# Patient Record
Sex: MALE | Race: ASIAN | NOT HISPANIC OR LATINO
[De-identification: names, ages, dates, MRNs, and addresses within clinical notes are randomized per-mention and may not be internally consistent; named-entity substitution may affect disease eponyms.]

---

## 2017-01-19 ENCOUNTER — FORM ENCOUNTER (OUTPATIENT)
Age: 67
End: 2017-01-19

## 2017-01-20 ENCOUNTER — OUTPATIENT (OUTPATIENT)
Dept: OUTPATIENT SERVICES | Facility: HOSPITAL | Age: 67
LOS: 1 days | End: 2017-01-20
Payer: MEDICARE

## 2017-01-20 ENCOUNTER — APPOINTMENT (OUTPATIENT)
Dept: CT IMAGING | Facility: CLINIC | Age: 67
End: 2017-01-20

## 2017-01-20 DIAGNOSIS — R05 COUGH: ICD-10-CM

## 2017-01-20 DIAGNOSIS — Z00.8 ENCOUNTER FOR OTHER GENERAL EXAMINATION: ICD-10-CM

## 2017-01-20 PROCEDURE — 71250 CT THORAX DX C-: CPT

## 2017-03-14 ENCOUNTER — APPOINTMENT (OUTPATIENT)
Dept: PULMONOLOGY | Facility: CLINIC | Age: 67
End: 2017-03-14

## 2017-04-04 ENCOUNTER — APPOINTMENT (OUTPATIENT)
Dept: PULMONOLOGY | Facility: CLINIC | Age: 67
End: 2017-04-04

## 2017-04-04 VITALS
RESPIRATION RATE: 15 BRPM | HEART RATE: 68 BPM | WEIGHT: 180 LBS | OXYGEN SATURATION: 97 % | BODY MASS INDEX: 28.25 KG/M2 | HEIGHT: 67 IN | SYSTOLIC BLOOD PRESSURE: 100 MMHG | DIASTOLIC BLOOD PRESSURE: 80 MMHG

## 2017-07-25 ENCOUNTER — APPOINTMENT (OUTPATIENT)
Dept: PULMONOLOGY | Facility: CLINIC | Age: 67
End: 2017-07-25

## 2017-07-25 VITALS
HEIGHT: 69 IN | DIASTOLIC BLOOD PRESSURE: 66 MMHG | OXYGEN SATURATION: 98 % | BODY MASS INDEX: 25.62 KG/M2 | RESPIRATION RATE: 17 BRPM | HEART RATE: 67 BPM | WEIGHT: 173 LBS | SYSTOLIC BLOOD PRESSURE: 114 MMHG

## 2017-09-28 ENCOUNTER — MEDICATION RENEWAL (OUTPATIENT)
Age: 67
End: 2017-09-28

## 2017-10-10 ENCOUNTER — MEDICATION RENEWAL (OUTPATIENT)
Age: 67
End: 2017-10-10

## 2017-10-10 RX ORDER — FLUTICASONE FUROATE 200 UG/1
200 POWDER RESPIRATORY (INHALATION)
Qty: 1 | Refills: 3 | Status: ACTIVE | COMMUNITY
Start: 2017-04-04 | End: 1900-01-01

## 2017-11-21 ENCOUNTER — APPOINTMENT (OUTPATIENT)
Dept: PULMONOLOGY | Facility: CLINIC | Age: 67
End: 2017-11-21
Payer: MEDICARE

## 2017-11-21 VITALS
HEART RATE: 69 BPM | HEIGHT: 69 IN | WEIGHT: 179 LBS | SYSTOLIC BLOOD PRESSURE: 122 MMHG | DIASTOLIC BLOOD PRESSURE: 80 MMHG | OXYGEN SATURATION: 98 % | BODY MASS INDEX: 26.51 KG/M2

## 2017-11-21 DIAGNOSIS — Z87.01 PERSONAL HISTORY OF PNEUMONIA (RECURRENT): ICD-10-CM

## 2017-11-21 PROCEDURE — 94010 BREATHING CAPACITY TEST: CPT

## 2017-11-21 PROCEDURE — 99214 OFFICE O/P EST MOD 30 MIN: CPT | Mod: 25

## 2018-02-04 ENCOUNTER — FORM ENCOUNTER (OUTPATIENT)
Age: 68
End: 2018-02-04

## 2018-02-05 ENCOUNTER — OUTPATIENT (OUTPATIENT)
Dept: OUTPATIENT SERVICES | Facility: HOSPITAL | Age: 68
LOS: 1 days | End: 2018-02-05
Payer: MEDICARE

## 2018-02-05 ENCOUNTER — APPOINTMENT (OUTPATIENT)
Dept: CT IMAGING | Facility: CLINIC | Age: 68
End: 2018-02-05
Payer: MEDICARE

## 2018-02-05 DIAGNOSIS — R93.8 ABNORMAL FINDINGS ON DIAGNOSTIC IMAGING OF OTHER SPECIFIED BODY STRUCTURES: ICD-10-CM

## 2018-02-05 DIAGNOSIS — Z00.8 ENCOUNTER FOR OTHER GENERAL EXAMINATION: ICD-10-CM

## 2018-02-05 DIAGNOSIS — J47.9 BRONCHIECTASIS, UNCOMPLICATED: ICD-10-CM

## 2018-02-05 PROCEDURE — 71250 CT THORAX DX C-: CPT

## 2018-02-05 PROCEDURE — 71250 CT THORAX DX C-: CPT | Mod: 26

## 2018-03-13 ENCOUNTER — LABORATORY RESULT (OUTPATIENT)
Age: 68
End: 2018-03-13

## 2018-04-06 ENCOUNTER — APPOINTMENT (OUTPATIENT)
Dept: PULMONOLOGY | Facility: CLINIC | Age: 68
End: 2018-04-06
Payer: MEDICARE

## 2018-04-06 VITALS
DIASTOLIC BLOOD PRESSURE: 80 MMHG | RESPIRATION RATE: 17 BRPM | OXYGEN SATURATION: 98 % | BODY MASS INDEX: 26.07 KG/M2 | SYSTOLIC BLOOD PRESSURE: 110 MMHG | WEIGHT: 176 LBS | HEIGHT: 69 IN | HEART RATE: 56 BPM

## 2018-04-06 PROCEDURE — 94010 BREATHING CAPACITY TEST: CPT

## 2018-04-06 PROCEDURE — 99214 OFFICE O/P EST MOD 30 MIN: CPT | Mod: 25

## 2018-04-06 RX ORDER — TERBINAFINE HYDROCHLORIDE 250 MG/1
250 TABLET ORAL
Qty: 30 | Refills: 0 | Status: ACTIVE | COMMUNITY
Start: 2018-03-29

## 2018-04-06 RX ORDER — ATORVASTATIN CALCIUM 20 MG/1
20 TABLET, FILM COATED ORAL
Qty: 90 | Refills: 0 | Status: ACTIVE | COMMUNITY
Start: 2018-03-06

## 2018-04-06 RX ORDER — POLYETHYLENE GLYCOL 3350 17 G/17G
17 POWDER, FOR SOLUTION ORAL
Qty: 255 | Refills: 0 | Status: ACTIVE | COMMUNITY
Start: 2018-03-29

## 2018-05-02 LAB — ACID FAST STN SPT: NORMAL

## 2018-05-17 ENCOUNTER — APPOINTMENT (OUTPATIENT)
Dept: PULMONOLOGY | Facility: CLINIC | Age: 68
End: 2018-05-17
Payer: MEDICARE

## 2018-05-17 VITALS
DIASTOLIC BLOOD PRESSURE: 60 MMHG | OXYGEN SATURATION: 98 % | HEART RATE: 58 BPM | WEIGHT: 178 LBS | BODY MASS INDEX: 27.94 KG/M2 | HEIGHT: 67 IN | SYSTOLIC BLOOD PRESSURE: 100 MMHG | RESPIRATION RATE: 14 BRPM

## 2018-05-17 DIAGNOSIS — Z79.899 OTHER LONG TERM (CURRENT) DRUG THERAPY: ICD-10-CM

## 2018-05-17 PROCEDURE — 99214 OFFICE O/P EST MOD 30 MIN: CPT

## 2018-05-18 LAB
ALBUMIN SERPL ELPH-MCNC: 4.3 G/DL
ALBUMIN SERPL ELPH-MCNC: 4.3 G/DL
ALP BLD-CCNC: 64 U/L
ALT SERPL-CCNC: 19 U/L
ANION GAP SERPL CALC-SCNC: 9 MMOL/L
AST SERPL-CCNC: 21 U/L
BILIRUB DIRECT SERPL-MCNC: 0.1 MG/DL
BILIRUB INDIRECT SERPL-MCNC: NORMAL
BILIRUB SERPL-MCNC: <0.2 MG/DL
BUN SERPL-MCNC: 15 MG/DL
CALCIUM SERPL-MCNC: 9.6 MG/DL
CHLORIDE SERPL-SCNC: 106 MMOL/L
CO2 SERPL-SCNC: 27 MMOL/L
CREAT SERPL-MCNC: 0.79 MG/DL
GLUCOSE SERPL-MCNC: 125 MG/DL
PHOSPHATE SERPL-MCNC: 2.7 MG/DL
POTASSIUM SERPL-SCNC: 5.2 MMOL/L
PROT SERPL-MCNC: 7 G/DL
SODIUM SERPL-SCNC: 142 MMOL/L

## 2018-06-25 ENCOUNTER — LABORATORY RESULT (OUTPATIENT)
Age: 68
End: 2018-06-25

## 2018-07-13 ENCOUNTER — APPOINTMENT (OUTPATIENT)
Dept: PULMONOLOGY | Facility: CLINIC | Age: 68
End: 2018-07-13
Payer: MEDICARE

## 2018-07-13 ENCOUNTER — NON-APPOINTMENT (OUTPATIENT)
Age: 68
End: 2018-07-13

## 2018-07-13 VITALS
HEART RATE: 66 BPM | WEIGHT: 178 LBS | OXYGEN SATURATION: 98 % | RESPIRATION RATE: 17 BRPM | HEIGHT: 67 IN | BODY MASS INDEX: 27.94 KG/M2 | SYSTOLIC BLOOD PRESSURE: 110 MMHG | DIASTOLIC BLOOD PRESSURE: 60 MMHG

## 2018-07-13 PROCEDURE — 99214 OFFICE O/P EST MOD 30 MIN: CPT | Mod: 25

## 2018-07-13 PROCEDURE — 94010 BREATHING CAPACITY TEST: CPT

## 2018-07-13 RX ORDER — UMECLIDINIUM BROMIDE AND VILANTEROL TRIFENATATE 62.5; 25 UG/1; UG/1
62.5-25 POWDER RESPIRATORY (INHALATION) DAILY
Qty: 1 | Refills: 3 | Status: DISCONTINUED | COMMUNITY
Start: 2017-04-04 | End: 2018-07-13

## 2018-07-13 RX ORDER — MOMETASONE 50 UG/1
50 SPRAY, METERED NASAL
Qty: 17 | Refills: 0 | Status: ACTIVE | COMMUNITY
Start: 2018-05-31

## 2018-08-06 ENCOUNTER — RX RENEWAL (OUTPATIENT)
Age: 68
End: 2018-08-06

## 2018-08-06 RX ORDER — ETHAMBUTOL HYDROCHLORIDE 400 MG/1
400 TABLET ORAL
Qty: 110 | Refills: 0 | Status: ACTIVE | COMMUNITY
Start: 2018-05-22 | End: 1900-01-01

## 2018-08-06 RX ORDER — RIFAMPIN 300 MG/1
300 CAPSULE ORAL
Qty: 72 | Refills: 1 | Status: ACTIVE | COMMUNITY
Start: 2018-05-22 | End: 1900-01-01

## 2018-08-06 RX ORDER — AZITHROMYCIN 500 MG/1
500 TABLET, FILM COATED ORAL
Qty: 36 | Refills: 1 | Status: ACTIVE | COMMUNITY
Start: 2018-05-22 | End: 1900-01-01

## 2018-08-15 LAB — ACID FAST STN SPT: NORMAL

## 2018-10-05 ENCOUNTER — APPOINTMENT (OUTPATIENT)
Dept: PULMONOLOGY | Facility: CLINIC | Age: 68
End: 2018-10-05

## 2018-10-05 LAB
ALBUMIN SERPL ELPH-MCNC: 4.6 G/DL
ALP BLD-CCNC: 60 U/L
ALT SERPL-CCNC: 11 U/L
AST SERPL-CCNC: 13 U/L
BILIRUB DIRECT SERPL-MCNC: 0.1 MG/DL
BILIRUB INDIRECT SERPL-MCNC: 0.3 MG/DL
BILIRUB SERPL-MCNC: 0.4 MG/DL
PROT SERPL-MCNC: 7.3 G/DL

## 2018-10-15 LAB
ALBUMIN SERPL ELPH-MCNC: 4.4 G/DL
ALP BLD-CCNC: 56 U/L
ALT SERPL-CCNC: 24 U/L
AST SERPL-CCNC: 25 U/L
BILIRUB DIRECT SERPL-MCNC: 0.1 MG/DL
BILIRUB INDIRECT SERPL-MCNC: 0.4 MG/DL
BILIRUB SERPL-MCNC: 0.5 MG/DL
PROT SERPL-MCNC: 7.3 G/DL

## 2018-10-30 ENCOUNTER — APPOINTMENT (OUTPATIENT)
Dept: PULMONOLOGY | Facility: CLINIC | Age: 68
End: 2018-10-30
Payer: MEDICARE

## 2018-10-30 ENCOUNTER — NON-APPOINTMENT (OUTPATIENT)
Age: 68
End: 2018-10-30

## 2018-10-30 VITALS
BODY MASS INDEX: 27.94 KG/M2 | OXYGEN SATURATION: 97 % | HEART RATE: 62 BPM | SYSTOLIC BLOOD PRESSURE: 120 MMHG | RESPIRATION RATE: 17 BRPM | WEIGHT: 178 LBS | DIASTOLIC BLOOD PRESSURE: 78 MMHG | HEIGHT: 67 IN

## 2018-10-30 PROCEDURE — 94010 BREATHING CAPACITY TEST: CPT

## 2018-10-30 PROCEDURE — 99214 OFFICE O/P EST MOD 30 MIN: CPT | Mod: 25

## 2018-10-30 RX ORDER — UMECLIDINIUM BROMIDE AND VILANTEROL TRIFENATATE 62.5; 25 UG/1; UG/1
62.5-25 POWDER RESPIRATORY (INHALATION) DAILY
Qty: 3 | Refills: 1 | Status: ACTIVE | COMMUNITY
Start: 2018-10-30 | End: 1900-01-01

## 2018-10-30 RX ORDER — RIFAMPIN 300 MG/1
300 CAPSULE ORAL
Qty: 72 | Refills: 3 | Status: ACTIVE | COMMUNITY
Start: 2018-10-30 | End: 1900-01-01

## 2018-10-30 RX ORDER — FLUTICASONE FUROATE 200 UG/1
200 POWDER RESPIRATORY (INHALATION)
Qty: 3 | Refills: 1 | Status: ACTIVE | COMMUNITY
Start: 2018-10-30 | End: 1900-01-01

## 2018-10-30 NOTE — ADDENDUM
[FreeTextEntry1] : Documented by Erin Rose acting as a scribe for Dr. Eleazar Clemons on 10/30/2018.\par \par All medical record entries made by the Scribe were at my, Dr. Eleazar Clemons's, direction and personally dictated by me on 10//30/2018. I have reviewed the chart and agree that the record accurately reflects my personal performance of the history, physical exam, assessment and plan. I have also personally directed, reviewed, and agree with the discharge instructions.

## 2018-10-30 NOTE — REASON FOR VISIT
[Follow-Up] : a follow-up visit [Family Member] : family member [FreeTextEntry1] : abnormal chest CT, bronchiectasis, chronic cough, low vitamin D, RICHI, RADs, snoring and shortness of breath

## 2018-10-30 NOTE — HISTORY OF PRESENT ILLNESS
[FreeTextEntry1] : Mr. Aldrich is a 68 year old male presenting to the office for a follow up visit for abnormal chest CT, bronchiectasis, chronic cough, low vitamin D, RICHI, RADs, snoring and shortness of breath. His chief complaint is difficulty breathing. \par -He notes he has been feeling fine\par -He states his bowels are regular\par -He notes he has been sleeping well and takes a short nap in the middle of the day\par -He reports he has been exercising by jogging for 30 mins daily\par -He states his weight is stable\par -He denies chest pain or pressure, diarrhea, constipation, coughing, wheezing, dizziness, leg swelling or pains, muscle aches or pains, joint aches or pains, nasal congestion, rhinitis,

## 2018-10-30 NOTE — PHYSICAL EXAM
[General Appearance - Well Developed] : well developed [Normal Appearance] : normal appearance [Well Groomed] : well groomed [General Appearance - Well Nourished] : well nourished [No Deformities] : no deformities [General Appearance - In No Acute Distress] : no acute distress [Normal Conjunctiva] : the conjunctiva exhibited no abnormalities [Eyelids - No Xanthelasma] : the eyelids demonstrated no xanthelasmas [Normal Oropharynx] : normal oropharynx [III] : III [Neck Appearance] : the appearance of the neck was normal [Neck Cervical Mass (___cm)] : no neck mass was observed [Jugular Venous Distention Increased] : there was no jugular-venous distention [Thyroid Diffuse Enlargement] : the thyroid was not enlarged [Thyroid Nodule] : there were no palpable thyroid nodules [Heart Rate And Rhythm] : heart rate and rhythm were normal [Heart Sounds] : normal S1 and S2 [Murmurs] : no murmurs present [Respiration, Rhythm And Depth] : normal respiratory rhythm and effort [Exaggerated Use Of Accessory Muscles For Inspiration] : no accessory muscle use [Auscultation Breath Sounds / Voice Sounds] : lungs were clear to auscultation bilaterally [Abdomen Soft] : soft [Abdomen Tenderness] : non-tender [Abdomen Mass (___ Cm)] : no abdominal mass palpated [Abnormal Walk] : normal gait [Gait - Sufficient For Exercise Testing] : the gait was sufficient for exercise testing [Nail Clubbing] : no clubbing of the fingernails [Cyanosis, Localized] : no localized cyanosis [Petechial Hemorrhages (___cm)] : no petechial hemorrhages [Skin Color & Pigmentation] : normal skin color and pigmentation [] : no rash [No Venous Stasis] : no venous stasis [Skin Lesions] : no skin lesions [No Skin Ulcers] : no skin ulcer [No Xanthoma] : no  xanthoma was observed [Deep Tendon Reflexes (DTR)] : deep tendon reflexes were 2+ and symmetric [Sensation] : the sensory exam was normal to light touch and pinprick [No Focal Deficits] : no focal deficits [Oriented To Time, Place, And Person] : oriented to person, place, and time [Impaired Insight] : insight and judgment were intact [Affect] : the affect was normal [FreeTextEntry1] : I:E 1:3, clear

## 2018-10-30 NOTE — PROCEDURE
[FreeTextEntry1] : Recent Sputum revealed all negative\par \par PFT-spi reveals normal flows with FEV1 of 2.33  ,which is   93% of predicted, normal flow volume loop

## 2019-02-06 ENCOUNTER — FORM ENCOUNTER (OUTPATIENT)
Age: 69
End: 2019-02-06

## 2019-02-07 ENCOUNTER — OUTPATIENT (OUTPATIENT)
Dept: OUTPATIENT SERVICES | Facility: HOSPITAL | Age: 69
LOS: 1 days | End: 2019-02-07
Payer: MEDICARE

## 2019-02-07 ENCOUNTER — APPOINTMENT (OUTPATIENT)
Dept: CT IMAGING | Facility: CLINIC | Age: 69
End: 2019-02-07
Payer: MEDICARE

## 2019-02-07 DIAGNOSIS — Z00.8 ENCOUNTER FOR OTHER GENERAL EXAMINATION: ICD-10-CM

## 2019-02-07 PROCEDURE — 71250 CT THORAX DX C-: CPT

## 2019-02-07 PROCEDURE — 71250 CT THORAX DX C-: CPT | Mod: 26

## 2019-02-18 ENCOUNTER — APPOINTMENT (OUTPATIENT)
Dept: PULMONOLOGY | Facility: CLINIC | Age: 69
End: 2019-02-18
Payer: MEDICARE

## 2019-02-18 ENCOUNTER — NON-APPOINTMENT (OUTPATIENT)
Age: 69
End: 2019-02-18

## 2019-02-18 VITALS
BODY MASS INDEX: 28.25 KG/M2 | DIASTOLIC BLOOD PRESSURE: 78 MMHG | HEIGHT: 67 IN | SYSTOLIC BLOOD PRESSURE: 120 MMHG | OXYGEN SATURATION: 97 % | HEART RATE: 76 BPM | RESPIRATION RATE: 17 BRPM | WEIGHT: 180 LBS

## 2019-02-18 PROCEDURE — 99214 OFFICE O/P EST MOD 30 MIN: CPT | Mod: 25

## 2019-02-18 PROCEDURE — 94010 BREATHING CAPACITY TEST: CPT

## 2019-02-18 NOTE — ADDENDUM
[FreeTextEntry1] : Documented by Raymundo Maya acting as a scribe for Dr. Eleazar Clemons on 02/18/2019.\par All medical record entries made by the Scribe were at my, Dr. Eleazar Clemons's, direction and personally dictated by me on 02/18/2019. I have reviewed the chart and agree that the record accurately reflects my personal performance of the history, physical exam, assessment and plan. I have also personally directed, reviewed, and agree with the discharge instructions.\par

## 2019-02-18 NOTE — PHYSICAL EXAM

## 2019-02-18 NOTE — HISTORY OF PRESENT ILLNESS
[FreeTextEntry1] : Mr. MCKEON is a 68 year year old male with a history of RICHI, abnormal chest CT, bronchiectasis, asthma, ?OSAS, and low vitamin D who presents for a follow-up pulmonary evaluation. His chief complaint is *** .\par -he is feeling well\par -he is sleeping pretty well\par -he has minimal coughing\par -he exercises every morning, a very slow jog\par -he does not have any significant limitations\par -he is eating well\par -his weight is stable\par -he is snoring\par -he denies any headaches, nausea, vomiting, fever, chills, sweats, chest pain, chest pressure, diarrhea, constipation, dysphagia, dizziness, leg swelling, leg pain, itchy eyes, itchy ears, heartburn, reflux, or sour taste in the mouth.\par

## 2019-02-18 NOTE — ASSESSMENT
[FreeTextEntry1] : Mr. Aldrich has a history of RICHI, abnormal chest CT, bronchiectasis, asthma, ?OSAS, and low vitamin D. He is currently stable from a pulmonary perspective. \par \par His chronic cough is multifactorial due to:\par -bronchiectasis \par -bronchospasm/asthma \par \par problem 1: Bronchiectasis with RICHI (on therapy from May 2018 to November 2019)\par -RICHI seems to have resolved with treatment as sputum culture is negative (all negative 5/18)\par -instructed to continue therapy for 1 more year after clear sputum\par -instructed to follow up for eye exam every 3 months\par -recommended to continue to use Acapella device QD for at least 15 breaths while sitting\par -lab work will be necessary at various intervals and particularly at baseline, including liver function tests, uric acid level, and renal function testing (usually this is every 6 weeks, but will be modified specifically for patient)\par \par problem 2: abnormal chest CT\par -one area of focal bronchiectasis\par -no cultures found and sputum was negative AFB x3 (5/2018)\par -no changes; follow up chest CT in February 2020\par  \par problem 3: bronchiectasis\par -continue to use the acapella device multiple times a day\par Bronchiectasis is a chronic lung disease characterized by dilatation of airways, with injury to the bronchial walls due to recurrent infection and inflammation. It is typically distinguished by whether or not the patient has underlying cystic fibrosis (CF). Adult non-CF bronchiectasis (NCFB) is heterogenous and has numerous causes. idiopathic bronchiectasis and infection related bronchiectasis represent the majority of adult cases of NCFB in most series. The prevalence of NCFB appears to be increasing and prevalence of NCFB increases substantially with aging. \par \par problem 4: bronchospasm/asthma \par -continue to use Anoro at 1 inhalation QD\par -continue to use Arnuity 200 QD\par -Asthma is believed to be caused by inherited (genetic) and environmental factor, but its exact cause is unknown. Asthma may be triggered by allergens, lung infections, or irritants in the air. Asthma triggers are different for each person\par -Inhaler technique reviewed as well as oral hygiene techniques reviewed with patient. Avoidance of cold air, extremes of temperature, rescue inhaler should be used before exercise. Order of medication reviewed with patient. Recommended use of a cool mist humidifier in the bedroom. \par \par problem 5: r/o KEYANNA\par -set up home sleep study\par -recommended to use nasal saline\par -recommended to use Oxy-Aid by [facility]\par \par Sleep apnea is associated with adverse clinical consequences which an affect most organ systems. Cardiovascular disease risk includes arrhythmias, atrial fibrillation, hypertension, coronary artery disease, and stroke. Metabolic disorders include diabetes type 2, non-alcoholic fatty liver disease. Mood disorder especially depression; and cognitive decline especially in the elderly. Associations with chronic reflux/Lema’s esophagus some but not all inclusive. \par -Reasons include arousal consistent with hypopnea; respiratory events most prominent in REM sleep or supine position; therefore sleep staging and body position are important for accurate diagnosis and estimation of AHI.\par \par problem 6: low vitamin D\par -continue to use vitamin D therapy\par -Has been associated with asthma exacerbations and increased allergic symptoms. The goal based on recent information is maintaining levels between 50-70 and low normal is 30. Recommended 50,000 units every two weeks to once a month depending on the level. \par \par problem 7: health maintenance \par -s/p 2018  influenza vaccination\par -recommended to exercise regularly \par -recommended vitamin B, recommended vitamin D, CoQ 10 at 200 mg QD\par -recommended strep pneumonia vaccines: Prevnar-13 vaccine (on hold, patient to consider), followed by Pneumo vaccine 23 one year following\par -recommended early intervention for URIs\par -recommended regular osteoporosis evaluations\par -recommended early dermatological evaluations\par -recommended after the age of 50 to the age of 70, colonoscopy every 5 years \par \par F/U in 4 months\par He is encouraged to call with any changes, concerns, or questions.

## 2019-02-18 NOTE — PROCEDURE
[FreeTextEntry1] : PFT- spi reveals normal flows; FEV1 was 2.37 L which is 94% of predicted; normal flow volume loop.\par \par His CT scan from 2/7/2019 revealed no change compared to the prior study. Right middle and left lower lobe bronchiectasis, mucoid impaction, and associated groundglass opacity similar compared to the prior study, and unchanged left lower lobe 3 mm nodule.

## 2019-05-08 ENCOUNTER — MEDICATION RENEWAL (OUTPATIENT)
Age: 69
End: 2019-05-08

## 2019-05-08 RX ORDER — ETHAMBUTOL HYDROCHLORIDE 400 MG/1
400 TABLET ORAL
Qty: 110 | Refills: 1 | Status: ACTIVE | COMMUNITY
Start: 2018-10-30 | End: 1900-01-01

## 2019-06-17 ENCOUNTER — NON-APPOINTMENT (OUTPATIENT)
Age: 69
End: 2019-06-17

## 2019-06-17 ENCOUNTER — APPOINTMENT (OUTPATIENT)
Dept: PULMONOLOGY | Facility: CLINIC | Age: 69
End: 2019-06-17
Payer: MEDICARE

## 2019-06-17 VITALS
WEIGHT: 180 LBS | HEART RATE: 62 BPM | DIASTOLIC BLOOD PRESSURE: 80 MMHG | SYSTOLIC BLOOD PRESSURE: 118 MMHG | RESPIRATION RATE: 17 BRPM | OXYGEN SATURATION: 98 % | HEIGHT: 67 IN | BODY MASS INDEX: 28.25 KG/M2

## 2019-06-17 PROCEDURE — 99214 OFFICE O/P EST MOD 30 MIN: CPT | Mod: 25

## 2019-06-17 PROCEDURE — 94010 BREATHING CAPACITY TEST: CPT

## 2019-06-17 RX ORDER — FLUTICASONE FUROATE 200 UG/1
200 POWDER RESPIRATORY (INHALATION)
Qty: 3 | Refills: 1 | Status: DISCONTINUED | COMMUNITY
Start: 2018-07-13 | End: 2019-06-17

## 2019-06-17 RX ORDER — UMECLIDINIUM BROMIDE AND VILANTEROL TRIFENATATE 62.5; 25 UG/1; UG/1
62.5-25 POWDER RESPIRATORY (INHALATION) DAILY
Qty: 3 | Refills: 1 | Status: DISCONTINUED | COMMUNITY
Start: 2018-04-06 | End: 2019-06-17

## 2019-06-17 RX ORDER — UMECLIDINIUM BROMIDE AND VILANTEROL TRIFENATATE 62.5; 25 UG/1; UG/1
62.5-25 POWDER RESPIRATORY (INHALATION) DAILY
Qty: 3 | Refills: 1 | Status: DISCONTINUED | COMMUNITY
Start: 2018-07-13 | End: 2019-06-17

## 2019-06-17 RX ORDER — FLUTICASONE FUROATE 200 UG/1
200 POWDER RESPIRATORY (INHALATION)
Qty: 3 | Refills: 1 | Status: DISCONTINUED | COMMUNITY
Start: 2018-04-06 | End: 2019-06-17

## 2019-06-17 NOTE — PROCEDURE
[FreeTextEntry1] : PFT revealed normal flows, with a FEV1 of 2.30L, which is 93% of predicted, with a normal flow volume loop

## 2019-06-17 NOTE — REASON FOR VISIT
[Follow-Up] : a follow-up visit [Family Member] : family member [FreeTextEntry1] : RICHI, abnormal chest CT, bronchiectasis, asthma, ?OSAS, and low vitamin D

## 2019-06-17 NOTE — PHYSICAL EXAM
[General Appearance - Well Developed] : well developed [Normal Appearance] : normal appearance [General Appearance - Well Nourished] : well nourished [No Deformities] : no deformities [Well Groomed] : well groomed [General Appearance - In No Acute Distress] : no acute distress [Normal Conjunctiva] : the conjunctiva exhibited no abnormalities [Eyelids - No Xanthelasma] : the eyelids demonstrated no xanthelasmas [Normal Oropharynx] : normal oropharynx [Neck Cervical Mass (___cm)] : no neck mass was observed [Neck Appearance] : the appearance of the neck was normal [Jugular Venous Distention Increased] : there was no jugular-venous distention [Thyroid Diffuse Enlargement] : the thyroid was not enlarged [Heart Rate And Rhythm] : heart rate and rhythm were normal [Thyroid Nodule] : there were no palpable thyroid nodules [Heart Sounds] : normal S1 and S2 [Murmurs] : no murmurs present [Respiration, Rhythm And Depth] : normal respiratory rhythm and effort [Exaggerated Use Of Accessory Muscles For Inspiration] : no accessory muscle use [Auscultation Breath Sounds / Voice Sounds] : lungs were clear to auscultation bilaterally [Abdomen Soft] : soft [Abdomen Tenderness] : non-tender [Abdomen Mass (___ Cm)] : no abdominal mass palpated [Abnormal Walk] : normal gait [Gait - Sufficient For Exercise Testing] : the gait was sufficient for exercise testing [Nail Clubbing] : no clubbing of the fingernails [Cyanosis, Localized] : no localized cyanosis [Petechial Hemorrhages (___cm)] : no petechial hemorrhages [Skin Color & Pigmentation] : normal skin color and pigmentation [] : no rash [No Venous Stasis] : no venous stasis [No Skin Ulcers] : no skin ulcer [Skin Lesions] : no skin lesions [No Xanthoma] : no  xanthoma was observed [Sensation] : the sensory exam was normal to light touch and pinprick [Deep Tendon Reflexes (DTR)] : deep tendon reflexes were 2+ and symmetric [No Focal Deficits] : no focal deficits [Oriented To Time, Place, And Person] : oriented to person, place, and time [Affect] : the affect was normal [Impaired Insight] : insight and judgment were intact [III] : III [FreeTextEntry1] : I:E 1:3; clear.

## 2019-06-17 NOTE — HISTORY OF PRESENT ILLNESS
[FreeTextEntry1] : Mr. MCKEON is a 69 year year old male with a history of RICHI, abnormal chest CT, bronchiectasis, asthma, ?OSAS, and low vitamin D who presents for a follow-up pulmonary evaluation. His chief complaint is stress.\par -he reports feeling somewhat stressed after her wife fell and currently is in rehab for a broken leg\par -his son notes he hasn't been sleeping well recently\par -he reports eating well\par -he notes his senses of smell and taste are good\par -he reports his sinuses are clear\par -his son believes he is snoring, but is unsure\par -he reports he has not been coughing a lot, or bringing up sputum recently\par -he notes walking, but doesn't exercise regularly\par -he denies taking any new medications, vitamins, or supplements, except for a new cholesterol medication, which he is tolerating well\par -he denies any wheezing, chest pain, chest pressure, diarrhea, constipation, dysphagia, dizziness, leg swelling, leg pain, itchy eyes, itchy ears, heartburn, reflux, sour taste in the mouth, myalgias or arthralgias.

## 2019-06-17 NOTE — ASSESSMENT
[FreeTextEntry1] : Mr. Aldrich has a history of RICHI, abnormal chest CT, bronchiectasis, asthma, ?OSAS, and low vitamin D. He is currently stable from a pulmonary perspective. His number one issue is stress due to his wife being in rehab for a leg fracture.\par \par His chronic cough is multifactorial due to:\par -bronchiectasis \par -bronchospasm/asthma \par \par problem 1: Bronchiectasis with RICHI (on therapy from May 2018 to November 2019)\par -RICHI seems to have resolved with treatment as sputum culture is negative (all negative 5/18)\par -instructed to continue therapy for 1 more year after clear sputum\par -instructed to follow up for eye exam every 3 months\par -recommended to continue to use Acapella device QD for at least 15 breaths while sitting\par -lab work will be necessary at various intervals and particularly at baseline, including liver function tests, uric acid level, and renal function testing (usually this is every 6 weeks, but will be modified specifically for patient)\par \par problem 2: abnormal chest CT\par -one area of focal bronchiectasis\par -no cultures found and sputum was negative AFB x3 (5/2018)\par -no changes; follow up chest CT in February 2020\par  \par problem 3: bronchiectasis\par -continue to use the acapella device multiple times a day\par Bronchiectasis is a chronic lung disease characterized by dilatation of airways, with injury to the bronchial walls due to recurrent infection and inflammation. It is typically distinguished by whether or not the patient has underlying cystic fibrosis (CF). Adult non-CF bronchiectasis (NCFB) is heterogenous and has numerous causes. idiopathic bronchiectasis and infection related bronchiectasis represent the majority of adult cases of NCFB in most series. The prevalence of NCFB appears to be increasing and prevalence of NCFB increases substantially with aging. \par \par problem 4: bronchospasm/asthma \par -continue to use Anoro at 1 inhalation QD\par -continue to use Arnuity 200 QD\par -Asthma is believed to be caused by inherited (genetic) and environmental factor, but its exact cause is unknown. Asthma may be triggered by allergens, lung infections, or irritants in the air. Asthma triggers are different for each person\par -Inhaler technique reviewed as well as oral hygiene techniques reviewed with patient. Avoidance of cold air, extremes of temperature, rescue inhaler should be used before exercise. Order of medication reviewed with patient. Recommended use of a cool mist humidifier in the bedroom. \par \par problem 5: r/o KEYANNA (not present)\par -s/p home sleep study (negative)\par -recommended to use nasal saline\par -recommended to use Oxy-Aid by [facility]\par \par Sleep apnea is associated with adverse clinical consequences which an affect most organ systems. Cardiovascular disease risk includes arrhythmias, atrial fibrillation, hypertension, coronary artery disease, and stroke. Metabolic disorders include diabetes type 2, non-alcoholic fatty liver disease. Mood disorder especially depression; and cognitive decline especially in the elderly. Associations with chronic reflux/Lema’s esophagus some but not all inclusive. \par -Reasons include arousal consistent with hypopnea; respiratory events most prominent in REM sleep or supine position; therefore sleep staging and body position are important for accurate diagnosis and estimation of AHI.\par \par problem 6: low vitamin D\par -continue to use vitamin D therapy\par -Has been associated with asthma exacerbations and increased allergic symptoms. The goal based on recent information is maintaining levels between 50-70 and low normal is 30. Recommended 50,000 units every two weeks to once a month depending on the level. \par \par problem 7: health maintenance \par -s/p 2018  influenza vaccination\par -recommended to exercise regularly \par -recommended vitamin B, recommended vitamin D, CoQ 10 at 200 mg QD\par -recommended strep pneumonia vaccines: Prevnar-13 vaccine (on hold, patient to consider), followed by Pneumo vaccine 23 one year following\par -recommended early intervention for URIs\par -recommended regular osteoporosis evaluations\par -recommended early dermatological evaluations\par -recommended after the age of 50 to the age of 70, colonoscopy every 5 years \par \par F/U in 4 months\par He is encouraged to call with any changes, concerns, or questions.

## 2019-08-01 ENCOUNTER — MEDICATION RENEWAL (OUTPATIENT)
Age: 69
End: 2019-08-01

## 2019-08-01 RX ORDER — AZITHROMYCIN 500 MG/1
500 TABLET, FILM COATED ORAL
Qty: 45 | Refills: 1 | Status: ACTIVE | COMMUNITY
Start: 2018-10-30 | End: 1900-01-01

## 2019-11-11 ENCOUNTER — APPOINTMENT (OUTPATIENT)
Dept: PULMONOLOGY | Facility: CLINIC | Age: 69
End: 2019-11-11
Payer: MEDICARE

## 2019-11-11 ENCOUNTER — NON-APPOINTMENT (OUTPATIENT)
Age: 69
End: 2019-11-11

## 2019-11-11 VITALS
OXYGEN SATURATION: 98 % | BODY MASS INDEX: 28.25 KG/M2 | HEART RATE: 67 BPM | DIASTOLIC BLOOD PRESSURE: 70 MMHG | WEIGHT: 180 LBS | SYSTOLIC BLOOD PRESSURE: 110 MMHG | HEIGHT: 67 IN | RESPIRATION RATE: 17 BRPM

## 2019-11-11 DIAGNOSIS — R91.8 OTHER NONSPECIFIC ABNORMAL FINDING OF LUNG FIELD: ICD-10-CM

## 2019-11-11 DIAGNOSIS — R06.02 SHORTNESS OF BREATH: ICD-10-CM

## 2019-11-11 DIAGNOSIS — R05 COUGH: ICD-10-CM

## 2019-11-11 DIAGNOSIS — R79.89 OTHER SPECIFIED ABNORMAL FINDINGS OF BLOOD CHEMISTRY: ICD-10-CM

## 2019-11-11 DIAGNOSIS — J47.9 BRONCHIECTASIS, UNCOMPLICATED: ICD-10-CM

## 2019-11-11 DIAGNOSIS — D89.2 HYPERGAMMAGLOBULINEMIA, UNSPECIFIED: ICD-10-CM

## 2019-11-11 DIAGNOSIS — A31.0 PULMONARY MYCOBACTERIAL INFECTION: ICD-10-CM

## 2019-11-11 DIAGNOSIS — R06.83 SNORING: ICD-10-CM

## 2019-11-11 DIAGNOSIS — J45.909 UNSPECIFIED ASTHMA, UNCOMPLICATED: ICD-10-CM

## 2019-11-11 PROCEDURE — 99214 OFFICE O/P EST MOD 30 MIN: CPT | Mod: 25

## 2019-11-11 PROCEDURE — 94010 BREATHING CAPACITY TEST: CPT

## 2019-11-11 NOTE — PHYSICAL EXAM
[General Appearance - Well Developed] : well developed [Normal Appearance] : normal appearance [No Deformities] : no deformities [Well Groomed] : well groomed [General Appearance - Well Nourished] : well nourished [General Appearance - In No Acute Distress] : no acute distress [Normal Conjunctiva] : the conjunctiva exhibited no abnormalities [Normal Oropharynx] : normal oropharynx [Eyelids - No Xanthelasma] : the eyelids demonstrated no xanthelasmas [Neck Appearance] : the appearance of the neck was normal [Neck Cervical Mass (___cm)] : no neck mass was observed [Jugular Venous Distention Increased] : there was no jugular-venous distention [Thyroid Diffuse Enlargement] : the thyroid was not enlarged [Thyroid Nodule] : there were no palpable thyroid nodules [Heart Sounds] : normal S1 and S2 [Murmurs] : no murmurs present [Heart Rate And Rhythm] : heart rate and rhythm were normal [Respiration, Rhythm And Depth] : normal respiratory rhythm and effort [Exaggerated Use Of Accessory Muscles For Inspiration] : no accessory muscle use [Auscultation Breath Sounds / Voice Sounds] : lungs were clear to auscultation bilaterally [Abdomen Tenderness] : non-tender [Abdomen Soft] : soft [Abnormal Walk] : normal gait [Abdomen Mass (___ Cm)] : no abdominal mass palpated [Gait - Sufficient For Exercise Testing] : the gait was sufficient for exercise testing [Nail Clubbing] : no clubbing of the fingernails [Petechial Hemorrhages (___cm)] : no petechial hemorrhages [Cyanosis, Localized] : no localized cyanosis [Skin Color & Pigmentation] : normal skin color and pigmentation [] : no rash [Skin Lesions] : no skin lesions [No Venous Stasis] : no venous stasis [No Skin Ulcers] : no skin ulcer [No Xanthoma] : no  xanthoma was observed [Deep Tendon Reflexes (DTR)] : deep tendon reflexes were 2+ and symmetric [Sensation] : the sensory exam was normal to light touch and pinprick [No Focal Deficits] : no focal deficits [Oriented To Time, Place, And Person] : oriented to person, place, and time [Affect] : the affect was normal [Impaired Insight] : insight and judgment were intact [II] : II [FreeTextEntry1] : I:E 1:3; clear.

## 2019-11-11 NOTE — PROCEDURE
[FreeTextEntry1] : PFT revealed normal flows, with a FEV1 of 2.19L, which is 88% of predicted, with a normal flow volume loop

## 2019-11-11 NOTE — HISTORY OF PRESENT ILLNESS
[FreeTextEntry1] : Mr. MCKEON is a 69 year year old male with a history of RICHI, abnormal chest CT, bronchiectasis, asthma, ?OSAS, and low vitamin D who presents for a follow-up pulmonary evaluation. He has no complaints.\par -he reports feeling generally well\par -he states he is able to fall asleep very quickly, and wakes up at night with nocturia\par -he reports he brings up mucus every few days\par he denies taking any new medications, vitamins, or supplements, and is not taking Vitamin D\par -he notes the ABx he was given have run out\par -he denies any visual issues, headaches, nausea, vomiting, fever, chills, sweats, chest pain, chest pressure, diarrhea, constipation, dysphagia, dizziness, sour taste in the mouth, leg swelling, leg pain, itchy eyes, itchy ears, heartburn, reflux

## 2019-11-11 NOTE — ADDENDUM
[FreeTextEntry1] : Documented by Pete Sinclair acting as a scribe for Dr. Eleazar Clemons on 11/11/2019.\par \par All medical record entries made by the Scribe were at my, Dr. Eleazar Clemons's, direction and personally dictated by me on 11/11/2019. I have reviewed the chart and agree that the record accurately reflects my personal performance of the history, physical exam, assessment and plan. I have also personally directed, reviewed, and agree with the discharge instructions.

## 2019-11-11 NOTE — REASON FOR VISIT
[Family Member] : family member [Follow-Up] : a follow-up visit [FreeTextEntry1] : RICHI, abnormal chest CT, bronchiectasis, asthma, ?OSAS, and low vitamin D

## 2019-11-11 NOTE — ASSESSMENT
Discharge Instructions For Your Heart Catheterization/Stent with Radial/Wrist Site    Activity: For the next 24 hours  Follow these guidelines related to the sedation medicine that you've received.   · You must have someone drive you home.  · You may feel tired, unsteady, or not quite yourself for the remainder of the day due to the sedation medicine. Your body gets rid of the medicine usually in 24 hours.  · Do not drive or operate machinery or power tools.  · Do not drink alcohol or smoke.  · Do not make any important decisions or sign important papers.    Activity:   Follow these guidelines related to the puncture site in your wrist.  · Minimal use of the arm used for the procedure for the remainder of the day. If possible, elevate your arm on a pillow and don't bend your wrist.  · No lifting more than 5 pounds for 5 days with the arm used for the procedure.  · If you do heavy physical work on your job, follow your doctor's instructions about when you may return to work.  · Use ice pack for discomfort.  · If you have a wrist immobilizer, remove the following morning.    Procedure Site Care:  · Keep the dressing on your procedure site for the remainder of the day. The following day, you may remove the dressing and shower. Gently cleanse the procedure site with soap and water and a clean wash cloth and pat dry.   · Apply a new Band-aid on the puncture site for 1 day;  Do not apply lotions, powders, or creams.  · No soaking the wrist in water (i.e., bathtub, hot tub, dish water, pool of water) until the wound has completely healed (3-5 days).    Common side effects you may notice  · Soreness at puncture site.  · Slight bruising at the site for several days to several weeks.  · Lump the size of a pea or marble at the puncture site for a few weeks.    Diet/Fluids  · Resume diet as prior to admission.  · If you had a catheterization or stent, drink plenty of liquids to help flush the dye used for your procedure out of  [FreeTextEntry1] : Mr. Aldrich has a history of RICHI, abnormal chest CT, bronchiectasis, asthma, ?OSAS, and low vitamin D. He is currently stable from a pulmonary perspective. His number one issue is caring for his wife.\par \par His chronic cough is multifactorial due to:\par -bronchiectasis \par -bronchospasm/asthma \par \par problem 1: Bronchiectasis with RICHI (completed therapy from May 2018 to November 2019) - off Rx\par -RICHI seems to have resolved with treatment as sputum culture is negative (all negative 5/18)\par -instructed to continue therapy for 1 more year after clear sputum\par -instructed to follow up for eye exam every 3 months\par -recommended to continue to use Acapella device QD for at least 15 breaths while sitting\par -lab work will be necessary at various intervals and particularly at baseline, including liver function tests, uric acid level, and renal function testing (usually this is every 6 weeks, but will be modified specifically for patient)\par \par problem 2: abnormal chest CT\par -one area of focal bronchiectasis\par -no cultures found and sputum was negative AFB x3 (5/2018)\par -no changes; follow up chest CT in February 2020\par  \par problem 3: bronchiectasis\par -continue to use the acapella device multiple times a day\par Bronchiectasis is a chronic lung disease characterized by dilatation of airways, with injury to the bronchial walls due to recurrent infection and inflammation. It is typically distinguished by whether or not the patient has underlying cystic fibrosis (CF). Adult non-CF bronchiectasis (NCFB) is heterogenous and has numerous causes. idiopathic bronchiectasis and infection related bronchiectasis represent the majority of adult cases of NCFB in most series. The prevalence of NCFB appears to be increasing and prevalence of NCFB increases substantially with aging. \par \par problem 4: bronchospasm/asthma \par -continue to use Anoro at 1 inhalation QD\par -continue to use Arnuity 200 QD\par -Asthma is believed to be caused by inherited (genetic) and environmental factor, but its exact cause is unknown. Asthma may be triggered by allergens, lung infections, or irritants in the air. Asthma triggers are different for each person\par -Inhaler technique reviewed as well as oral hygiene techniques reviewed with patient. Avoidance of cold air, extremes of temperature, rescue inhaler should be used before exercise. Order of medication reviewed with patient. Recommended use of a cool mist humidifier in the bedroom. \par \par problem 5: r/o KEYANNA (not present)\par -s/p home sleep study (negative)\par -recommended to use nasal saline\par -recommended to use Oxy-Aid by [employer]\par \par Sleep apnea is associated with adverse clinical consequences which an affect most organ systems. Cardiovascular disease risk includes arrhythmias, atrial fibrillation, hypertension, coronary artery disease, and stroke. Metabolic disorders include diabetes type 2, non-alcoholic fatty liver disease. Mood disorder especially depression; and cognitive decline especially in the elderly. Associations with chronic reflux/Lema’s esophagus some but not all inclusive. \par -Reasons include arousal consistent with hypopnea; respiratory events most prominent in REM sleep or supine position; therefore sleep staging and body position are important for accurate diagnosis and estimation of AHI.\par \par problem 6: low vitamin D\par -continue to use vitamin D therapy\par -Has been associated with asthma exacerbations and increased allergic symptoms. The goal based on recent information is maintaining levels between 50-70 and low normal is 30. Recommended 50,000 units every two weeks to once a month depending on the level. \par \par problem 7: health maintenance \par -s/p influenza vaccination 2019 with family doctor\par -recommended to exercise regularly \par -recommended vitamin B, recommended vitamin D, CoQ 10 at 200 mg QD\par -recommended strep pneumonia vaccines: Prevnar-13 vaccine (on hold, patient to consider), followed by Pneumo vaccine 23 one year following\par -recommended early intervention for URIs\par -recommended regular osteoporosis evaluations\par -recommended early dermatological evaluations\par -recommended after the age of 50 to the age of 70, colonoscopy every 5 years \par \par F/U in 4 months\par He is encouraged to call with any changes, concerns, or questions.  your body (unless you're on a fluid restriction ordered by your physician).    Medications  · Take mild pain reliever such as Tylenol/Acetaminophen as needed for pain.    Call your doctor with these issues  · Bleeding from the procedure site. If significant bleeding occurs or there is a growing lump at your site, apply firm pressure at the site where your dressing is. Call 911 and keep pressure on the site.  · Numbness, tingling or color change in the arm used for puncture site.  · Increasing pain and firmness near the puncture site.  · A temperature greater than 101 degrees.  · Redness or drainage around site.

## 2019-11-11 NOTE — REVIEW OF SYSTEMS
[Negative] : Pulmonary Hypertension [Sputum] : sputum  [As Noted in HPI] : as noted in HPI [Difficulty Maintaining Sleep] : difficulty maintaining sleep [Fever] : no fever [Chills] : no chills [Chest Discomfort] : no chest discomfort [Itchy Eyes] : no itching of ~T the eyes [Heartburn] : no heartburn [Reflux] : no reflux [Dysphagia] : no dysphagia [Nausea] : no nausea [Constipation] : no constipation [Vomiting] : no vomiting [Diarrhea] : no diarrhea [Dizziness] : no dizziness [Difficulty Initiating Sleep] : no difficulty falling asleep [Headache] : no headache

## 2020-02-11 ENCOUNTER — FORM ENCOUNTER (OUTPATIENT)
Age: 70
End: 2020-02-11

## 2020-02-12 ENCOUNTER — OUTPATIENT (OUTPATIENT)
Dept: OUTPATIENT SERVICES | Facility: HOSPITAL | Age: 70
LOS: 1 days | End: 2020-02-12
Payer: MEDICARE

## 2020-02-12 ENCOUNTER — APPOINTMENT (OUTPATIENT)
Dept: CT IMAGING | Facility: CLINIC | Age: 70
End: 2020-02-12
Payer: MEDICARE

## 2020-02-12 DIAGNOSIS — R91.8 OTHER NONSPECIFIC ABNORMAL FINDING OF LUNG FIELD: ICD-10-CM

## 2020-02-12 DIAGNOSIS — Z00.8 ENCOUNTER FOR OTHER GENERAL EXAMINATION: ICD-10-CM

## 2020-02-12 PROCEDURE — 71250 CT THORAX DX C-: CPT

## 2020-02-12 PROCEDURE — 71250 CT THORAX DX C-: CPT | Mod: 26

## 2020-03-09 ENCOUNTER — APPOINTMENT (OUTPATIENT)
Dept: PULMONOLOGY | Facility: CLINIC | Age: 70
End: 2020-03-09

## 2020-03-17 ENCOUNTER — APPOINTMENT (OUTPATIENT)
Dept: PULMONOLOGY | Facility: CLINIC | Age: 70
End: 2020-03-17

## 2020-08-03 ENCOUNTER — APPOINTMENT (OUTPATIENT)
Dept: PULMONOLOGY | Facility: CLINIC | Age: 70
End: 2020-08-03

## 2022-10-31 ENCOUNTER — NON-APPOINTMENT (OUTPATIENT)
Age: 72
End: 2022-10-31

## 2023-03-15 NOTE — REVIEW OF SYSTEMS
Log 3/8-3/14 reviewed, 3 mild FBG elevations, most all PPs WNL. Continue Metformin 1000 mg PO BID.
[Negative] : Sleep Disorder

## 2025-06-03 NOTE — REASON FOR VISIT
What Is The Reason For Today's Visit?: Full Body Skin Examination What Is The Reason For Today's Visit? (Being Monitored For X): the development of new lesions [Follow-Up] : a follow-up visit [FreeTextEntry1] : RICHI, abnormal chest CT, bronchiectasis, asthma, ?OSAS, and low vitamin D